# Patient Record
Sex: FEMALE | Race: WHITE | Employment: FULL TIME | URBAN - METROPOLITAN AREA
[De-identification: names, ages, dates, MRNs, and addresses within clinical notes are randomized per-mention and may not be internally consistent; named-entity substitution may affect disease eponyms.]

---

## 2020-05-15 ENCOUNTER — HOSPITAL ENCOUNTER (EMERGENCY)
Facility: HOSPITAL | Age: 65
Discharge: HOME | End: 2020-05-15
Attending: EMERGENCY MEDICINE
Payer: MEDICARE

## 2020-05-15 VITALS
SYSTOLIC BLOOD PRESSURE: 143 MMHG | WEIGHT: 150 LBS | HEART RATE: 68 BPM | OXYGEN SATURATION: 96 % | BODY MASS INDEX: 26.58 KG/M2 | DIASTOLIC BLOOD PRESSURE: 107 MMHG | RESPIRATION RATE: 16 BRPM | HEIGHT: 63 IN | TEMPERATURE: 99 F

## 2020-05-15 DIAGNOSIS — R07.89 CHEST PAIN, ATYPICAL: ICD-10-CM

## 2020-05-15 DIAGNOSIS — M79.10 MYALGIA: Primary | ICD-10-CM

## 2020-05-15 PROCEDURE — 85025 COMPLETE CBC W/AUTO DIFF WBC: CPT | Performed by: EMERGENCY MEDICINE

## 2020-05-15 PROCEDURE — 84484 ASSAY OF TROPONIN QUANT: CPT | Performed by: EMERGENCY MEDICINE

## 2020-05-15 PROCEDURE — 93005 ELECTROCARDIOGRAM TRACING: CPT

## 2020-05-15 PROCEDURE — 80048 BASIC METABOLIC PNL TOTAL CA: CPT | Performed by: EMERGENCY MEDICINE

## 2020-05-15 PROCEDURE — 99284 EMERGENCY DEPT VISIT MOD MDM: CPT

## 2020-05-15 PROCEDURE — 96374 THER/PROPH/DIAG INJ IV PUSH: CPT

## 2020-05-15 PROCEDURE — 93010 ELECTROCARDIOGRAM REPORT: CPT | Performed by: EMERGENCY MEDICINE

## 2020-05-15 RX ORDER — KETOROLAC TROMETHAMINE 15 MG/ML
15 INJECTION, SOLUTION INTRAMUSCULAR; INTRAVENOUS ONCE
Status: COMPLETED | OUTPATIENT
Start: 2020-05-15 | End: 2020-05-15

## 2020-05-15 NOTE — ED PROVIDER NOTES
Patient Seen in: [facility] AND [facility] Emergency Department      History   Patient presents with: Body ache and/or chills    Stated Complaint: cp, diarrhea, headache    HPI    73 yo female with several days of body aches especially chest and back.  Has had s Pulmonary:      Effort: Pulmonary effort is normal. No respiratory distress. Abdominal:      General: Abdomen is flat. Palpations: There is no mass. Skin:     General: Skin is warm and dry.       Capillary Refill: Capillary refill takes less than History:  No         Other Risk Factor Score:  2           Lab Results   Component Value Date    TROP <0.045 05/15/2020         HEART Score: 3          Risk of adverse cardiac event is 0.9-1.7%                 Patient will be discharged home to follow up w

## 2020-05-15 NOTE — ED INITIAL ASSESSMENT (HPI)
Body aches, subjective fevers, some shortness of breath and chest pain while lying flat. This has been going on for a few days. Patient does work in [employer].

## 2022-03-05 ENCOUNTER — APPOINTMENT (OUTPATIENT)
Dept: CT IMAGING | Facility: HOSPITAL | Age: 67
End: 2022-03-05
Payer: MEDICARE

## 2022-03-05 ENCOUNTER — HOSPITAL ENCOUNTER (EMERGENCY)
Facility: HOSPITAL | Age: 67
Discharge: HOME | End: 2022-03-05
Payer: MEDICARE

## 2022-03-05 VITALS
TEMPERATURE: 98 F | DIASTOLIC BLOOD PRESSURE: 69 MMHG | HEART RATE: 91 BPM | OXYGEN SATURATION: 98 % | RESPIRATION RATE: 18 BRPM | SYSTOLIC BLOOD PRESSURE: 151 MMHG

## 2022-03-05 DIAGNOSIS — S00.93XA CONTUSION OF HEAD, UNSPECIFIED PART OF HEAD, INITIAL ENCOUNTER: Primary | ICD-10-CM

## 2022-03-05 PROCEDURE — 70450 CT HEAD/BRAIN W/O DYE: CPT

## 2022-03-05 PROCEDURE — 99284 EMERGENCY DEPT VISIT MOD MDM: CPT

## 2022-03-05 NOTE — ED INITIAL ASSESSMENT (HPI)
Pt with mechanical fall backward today landing on hard ground outside. Hematoma with small abrasion/lac to back of head. No LOC and no BT. Pt admits to having a few alcoholic drinks today but does not appear intoxicated in triage.

## 2022-12-17 ENCOUNTER — OFFICE VISIT (OUTPATIENT)
Dept: FAMILY MEDICINE CLINIC | Facility: CLINIC | Age: 67
End: 2022-12-17
Payer: MEDICARE

## 2022-12-17 VITALS
TEMPERATURE: 97 F | HEART RATE: 70 BPM | DIASTOLIC BLOOD PRESSURE: 68 MMHG | RESPIRATION RATE: 18 BRPM | HEIGHT: 63 IN | SYSTOLIC BLOOD PRESSURE: 130 MMHG | OXYGEN SATURATION: 97 % | WEIGHT: 161.5 LBS | BODY MASS INDEX: 28.62 KG/M2

## 2022-12-17 DIAGNOSIS — H10.31 ACUTE BACTERIAL CONJUNCTIVITIS OF RIGHT EYE: Primary | ICD-10-CM

## 2022-12-17 PROCEDURE — 99202 OFFICE O/P NEW SF 15 MIN: CPT | Performed by: NURSE PRACTITIONER

## 2022-12-17 RX ORDER — POLYMYXIN B SULFATE AND TRIMETHOPRIM 1; 10000 MG/ML; [USP'U]/ML
1 SOLUTION OPHTHALMIC EVERY 6 HOURS
Qty: 1 EACH | Refills: 0 | Status: SHIPPED | OUTPATIENT
Start: 2022-12-17 | End: 2022-12-24

## 2022-12-17 RX ORDER — BREXPIPRAZOLE 1 MG/1
1 TABLET ORAL DAILY
COMMUNITY
Start: 2022-12-15

## 2022-12-17 RX ORDER — ATORVASTATIN CALCIUM 40 MG/1
40 TABLET, FILM COATED ORAL DAILY
COMMUNITY
Start: 2022-11-02

## 2022-12-17 RX ORDER — PROPRANOLOL HCL 60 MG
1 CAPSULE, EXTENDED RELEASE 24HR ORAL DAILY
COMMUNITY
Start: 2022-12-11

## 2022-12-17 RX ORDER — LOSARTAN POTASSIUM AND HYDROCHLOROTHIAZIDE 12.5; 5 MG/1; MG/1
1 TABLET ORAL DAILY
COMMUNITY
Start: 2022-11-10

## 2022-12-17 RX ORDER — MELATONIN 10 MG
10 CAPSULE ORAL NIGHTLY
COMMUNITY
Start: 2022-12-06 | End: 2023-03-06

## 2022-12-17 RX ORDER — BUPROPION HYDROCHLORIDE 300 MG/1
300 TABLET ORAL DAILY
COMMUNITY
Start: 2022-10-17

## 2022-12-17 NOTE — PATIENT INSTRUCTIONS
Conjunctivitis (Pink Eye) is very contagious. This is spread by direct contact after touching your infected eye. Wash hands frequently and avoid touching eyes! You will be a contagious risk to others until completing at least 24 hours of the antibiotic eye drops. Complete the entire prescription even after the symptoms have gone away. Launder you pillow case used last night and tomorrow morning. Apply a new clean warm moist compress to the affected eye as often as possible today. This is comforting and the warm compress increases the blood flow to the area and promotes healing. Discard any contact lenses that were worn recently and do not wear contacts for about 7 days. Discard any eye makeup worn recently.   **If you develop any eye pain, significant swelling/redness/pain surrounding eye area or sudden vision changes, go directly to the ER.**